# Patient Record
Sex: MALE | Race: WHITE | NOT HISPANIC OR LATINO | Employment: FULL TIME | URBAN - METROPOLITAN AREA
[De-identification: names, ages, dates, MRNs, and addresses within clinical notes are randomized per-mention and may not be internally consistent; named-entity substitution may affect disease eponyms.]

---

## 2022-03-07 ENCOUNTER — HISTORICAL (OUTPATIENT)
Dept: RADIOLOGY | Facility: HOSPITAL | Age: 57
End: 2022-03-07

## 2022-03-07 ENCOUNTER — HISTORICAL (OUTPATIENT)
Dept: ADMINISTRATIVE | Facility: HOSPITAL | Age: 57
End: 2022-03-07

## 2024-04-01 ENCOUNTER — HOSPITAL ENCOUNTER (OUTPATIENT)
Dept: CARDIOLOGY | Facility: HOSPITAL | Age: 59
Discharge: HOME | End: 2024-04-01
Attending: FAMILY MEDICINE
Payer: COMMERCIAL

## 2024-04-01 DIAGNOSIS — R00.2 PALPITATIONS: Primary | ICD-10-CM

## 2024-04-01 DIAGNOSIS — R00.2 PALPITATIONS: ICD-10-CM

## 2024-04-01 PROCEDURE — 93227 XTRNL ECG REC<48 HR R&I: CPT | Mod: ,,, | Performed by: INTERNAL MEDICINE

## 2024-04-01 PROCEDURE — 93226 XTRNL ECG REC<48 HR SCAN A/R: CPT

## 2024-04-03 ENCOUNTER — HOSPITAL ENCOUNTER (EMERGENCY)
Facility: HOSPITAL | Age: 59
Discharge: HOME | End: 2024-04-03
Attending: STUDENT IN AN ORGANIZED HEALTH CARE EDUCATION/TRAINING PROGRAM
Payer: COMMERCIAL

## 2024-04-03 VITALS
OXYGEN SATURATION: 95 % | BODY MASS INDEX: 19.76 KG/M2 | SYSTOLIC BLOOD PRESSURE: 125 MMHG | HEART RATE: 57 BPM | RESPIRATION RATE: 12 BRPM | TEMPERATURE: 98 F | WEIGHT: 138 LBS | DIASTOLIC BLOOD PRESSURE: 76 MMHG | HEIGHT: 70 IN

## 2024-04-03 DIAGNOSIS — R07.9 CHEST PAIN, UNSPECIFIED TYPE: Primary | ICD-10-CM

## 2024-04-03 DIAGNOSIS — R07.9 CHEST PAIN: ICD-10-CM

## 2024-04-03 DIAGNOSIS — R00.2 PALPITATIONS: ICD-10-CM

## 2024-04-03 LAB
ALBUMIN SERPL-MCNC: 4 G/DL (ref 3.5–5)
ALBUMIN/GLOB SERPL: 1.2 RATIO (ref 1.1–2)
ALP SERPL-CCNC: 90 UNIT/L (ref 40–150)
ALT SERPL-CCNC: 13 UNIT/L (ref 0–55)
AST SERPL-CCNC: 19 UNIT/L (ref 5–34)
BASOPHILS # BLD AUTO: 0.04 X10(3)/MCL
BASOPHILS NFR BLD AUTO: 0.5 %
BILIRUB SERPL-MCNC: 0.3 MG/DL
BNP BLD-MCNC: 11 PG/ML
BUN SERPL-MCNC: 12.9 MG/DL (ref 8.4–25.7)
CALCIUM SERPL-MCNC: 9.2 MG/DL (ref 8.4–10.2)
CHLORIDE SERPL-SCNC: 104 MMOL/L (ref 98–107)
CO2 SERPL-SCNC: 27 MMOL/L (ref 22–29)
CREAT SERPL-MCNC: 0.89 MG/DL (ref 0.73–1.18)
EOSINOPHIL # BLD AUTO: 0.05 X10(3)/MCL (ref 0–0.9)
EOSINOPHIL NFR BLD AUTO: 0.7 %
ERYTHROCYTE [DISTWIDTH] IN BLOOD BY AUTOMATED COUNT: 12.5 % (ref 11.5–17)
GFR SERPLBLD CREATININE-BSD FMLA CKD-EPI: >60 MLS/MIN/1.73/M2
GLOBULIN SER-MCNC: 3.3 GM/DL (ref 2.4–3.5)
GLUCOSE SERPL-MCNC: 122 MG/DL (ref 74–100)
HCT VFR BLD AUTO: 43 % (ref 42–52)
HGB BLD-MCNC: 14.2 G/DL (ref 14–18)
IMM GRANULOCYTES # BLD AUTO: 0.02 X10(3)/MCL (ref 0–0.04)
IMM GRANULOCYTES NFR BLD AUTO: 0.3 %
LYMPHOCYTES # BLD AUTO: 2.08 X10(3)/MCL (ref 0.6–4.6)
LYMPHOCYTES NFR BLD AUTO: 28.5 %
MCH RBC QN AUTO: 29.8 PG (ref 27–31)
MCHC RBC AUTO-ENTMCNC: 33 G/DL (ref 33–36)
MCV RBC AUTO: 90.1 FL (ref 80–94)
MONOCYTES # BLD AUTO: 0.45 X10(3)/MCL (ref 0.1–1.3)
MONOCYTES NFR BLD AUTO: 6.2 %
NEUTROPHILS # BLD AUTO: 4.67 X10(3)/MCL (ref 2.1–9.2)
NEUTROPHILS NFR BLD AUTO: 63.8 %
NRBC BLD AUTO-RTO: 0 %
PLATELET # BLD AUTO: 220 X10(3)/MCL (ref 130–400)
PMV BLD AUTO: 10 FL (ref 7.4–10.4)
POTASSIUM SERPL-SCNC: 4 MMOL/L (ref 3.5–5.1)
PROT SERPL-MCNC: 7.3 GM/DL (ref 6.4–8.3)
RBC # BLD AUTO: 4.77 X10(6)/MCL (ref 4.7–6.1)
SODIUM SERPL-SCNC: 137 MMOL/L (ref 136–145)
TROPONIN I SERPL-MCNC: <0.01 NG/ML (ref 0–0.04)
TROPONIN I SERPL-MCNC: <0.01 NG/ML (ref 0–0.04)
WBC # SPEC AUTO: 7.31 X10(3)/MCL (ref 4.5–11.5)

## 2024-04-03 PROCEDURE — 84484 ASSAY OF TROPONIN QUANT: CPT | Performed by: PHYSICIAN ASSISTANT

## 2024-04-03 PROCEDURE — 80053 COMPREHEN METABOLIC PANEL: CPT | Performed by: PHYSICIAN ASSISTANT

## 2024-04-03 PROCEDURE — 93005 ELECTROCARDIOGRAM TRACING: CPT

## 2024-04-03 PROCEDURE — 93010 ELECTROCARDIOGRAM REPORT: CPT | Mod: ,,, | Performed by: INTERNAL MEDICINE

## 2024-04-03 PROCEDURE — 99285 EMERGENCY DEPT VISIT HI MDM: CPT | Mod: 25

## 2024-04-03 PROCEDURE — 84484 ASSAY OF TROPONIN QUANT: CPT | Performed by: STUDENT IN AN ORGANIZED HEALTH CARE EDUCATION/TRAINING PROGRAM

## 2024-04-03 PROCEDURE — 85025 COMPLETE CBC W/AUTO DIFF WBC: CPT | Performed by: PHYSICIAN ASSISTANT

## 2024-04-03 PROCEDURE — 83880 ASSAY OF NATRIURETIC PEPTIDE: CPT | Performed by: PHYSICIAN ASSISTANT

## 2024-04-03 NOTE — FIRST PROVIDER EVALUATION
"Medical screening examination initiated.  I have conducted a focused provider triage encounter, findings are as follows:    Chief Complaint   Patient presents with    Chest Pain     L. Sided cp that pt describes as "tight" and palpitations x4 wks. Has seen his PCP multiple times for it and states had labs/EKG/CT scan done and was referred to Cardiology. Pt wore a 24 hr holter monitor and turned it in on 4/02, has not heard anything regarding it yet. Pt takes suboxone daily, has been on it x5 yrs. Denies further PMH.      Brief history of present illness:  58 y.o. male presents to the ED with left sided chest pain onset 4 weeks ago. Has had work-up in the past with PCP, had holter monitor on 4/2, not given any results yet.     Vitals:    04/03/24 1611   BP: (!) 146/80   Pulse: 82   Resp: 19   Temp: 98.1 °F (36.7 °C)   TempSrc: Oral   SpO2: 97%   Weight: 62.6 kg (138 lb)   Height: 5' 10" (1.778 m)       Pertinent physical exam:  Awake, alert, ambulatory, non-labored respirations    Brief workup plan:  labs, EKG, CXR    Preliminary workup initiated; this workup will be continued and followed by the physician or advanced practice provider that is assigned to the patient when roomed.  "

## 2024-04-04 LAB
OHS QRS DURATION: 96 MS
OHS QTC CALCULATION: 427 MS

## 2024-04-04 NOTE — DISCHARGE INSTRUCTIONS
Thanks for letting us take care of you today!  It is our goal to give you courteous care and to keep you comfortable and informed, if you have any questions before you leave I will be happy to try and answer them.    Here is some advice after your visit:    Your visit in the emergency department is NOT definitive care - please follow-up with your primary care doctor and/or specialist within 1-2 days. Please return to the emergency department if you develop worsening symptoms including: fever, chills, chest pain, shortness of breath, weakness, numbness, tingling, nausea, vomiting, inability to eat, drink, or take your medication. Please return if you have any worsening in your condition or if you have any other concerns.    If you had radiology exams like an XRAY or CT in the emergency Department the interpreation on them may be preliminary - there may be less time sensitive findings on the reports please obtain these reports within 24 hours from the hospital or by using your out on your mobile phone to access records.  Bring these to your primary care doctor and/or specialist for further review of incidental findings.    Please review any LAB WORK from your visit today with your primary care physician.    Please be sure to follow up with the cardiologist and/or your primary care physician for further evaluation management.  You are welcome to return emergency department any time for any worsening symptoms.

## 2024-04-04 NOTE — ED PROVIDER NOTES
"Encounter Date: 4/3/2024    SCRIBE #1 NOTE: I, Kanu Lemus, am scribing for, and in the presence of,  Axel Blanton MD. I have scribed the following portions of the note - Other sections scribed: HPI, ROS, PE.       History     Chief Complaint   Patient presents with    Chest Pain     L. Sided cp that pt describes as "tight" and palpitations x4 wks. Has seen his PCP multiple times for it and states had labs/EKG/CT scan done and was referred to Cardiology. Pt wore a 24 hr holter monitor and turned it in on 4/02, has not heard anything regarding it yet. Pt takes suboxone daily, has been on it x5 yrs. Denies further PMH.      58 year old male presents to ED for evaluation of chest pain and palpitations onset a few weeks ago. Pt reports that his PCP is aware of his symptoms and gave a 24 hour holter monitor, taken off yesterday, has not received results yet, not followed by cardiology. Pt states that his palpations are intermittent and usually are present when he wakes up. Pt describes chest pain as intermittent tightness in his left chest, unchanged since onset. Pt denies cardiac history, family cardiac history, and pmhx of HTN.  Pt reports that he is prescribed suboxone, and some 1-1.5 packs of cigarettes per day, denies ETOh and illicit drugs.     The history is provided by the patient. No  was used.     Review of patient's allergies indicates:  No Known Allergies  No past medical history on file.  No past surgical history on file.  No family history on file.     Review of Systems   Constitutional:  Negative for fever.   Respiratory:  Negative for shortness of breath.    Cardiovascular:  Positive for chest pain.   Gastrointestinal:  Negative for abdominal pain.       Physical Exam     Initial Vitals [04/03/24 1611]   BP Pulse Resp Temp SpO2   (!) 146/80 82 19 98.1 °F (36.7 °C) 97 %      MAP       --         Physical Exam    Constitutional: He appears well-developed and well-nourished. " He is not diaphoretic. No distress.   thin   HENT:   Head: Normocephalic and atraumatic.   Right Ear: External ear normal.   Left Ear: External ear normal.   Nose: Nose normal.   Eyes: EOM are normal. Pupils are equal, round, and reactive to light. Right eye exhibits no discharge. Left eye exhibits no discharge.   Cardiovascular:  Normal rate, regular rhythm and normal heart sounds.     Exam reveals no gallop and no friction rub.       No murmur heard.  Pulmonary/Chest: Effort normal and breath sounds normal. No respiratory distress. He has no wheezes. He has no rhonchi. He has no rales. He exhibits no tenderness.   Abdominal: Abdomen is soft. Bowel sounds are normal. He exhibits no distension and no mass. There is no abdominal tenderness. There is no rebound and no guarding.   Musculoskeletal:         General: No edema. Normal range of motion.     Neurological: He is alert and oriented to person, place, and time. No cranial nerve deficit or sensory deficit.   Skin: Skin is warm and dry. Capillary refill takes less than 2 seconds.         ED Course   Procedures  Labs Reviewed   COMPREHENSIVE METABOLIC PANEL - Abnormal; Notable for the following components:       Result Value    Glucose Level 122 (*)     All other components within normal limits   B-TYPE NATRIURETIC PEPTIDE - Normal   TROPONIN I - Normal   TROPONIN I - Normal   CBC W/ AUTO DIFFERENTIAL    Narrative:     The following orders were created for panel order CBC auto differential.  Procedure                               Abnormality         Status                     ---------                               -----------         ------                     CBC with Differential[[id number]]                           Final result                 Please view results for these tests on the individual orders.   CBC WITH DIFFERENTIAL     EKG Readings: (Independently Interpreted)   Initial Reading: No STEMI. Rhythm: Normal Sinus Rhythm. Heart Rate: 81. ST Segments:  Normal ST Segments. T Waves: Normal. Axis: Right Axis Deviation.   Time: 1609  QTC: 427  High voltage QRS complexes     ECG Results              EKG 12-lead (Final result)        Collection Time Result Time QRS Duration OHS QTC Calculation    04/03/24 16:09:56 04/04/24 02:22:10 96 427                     Final result by Interface, Lab In [facility] (04/04/24 02:22:14)                   Narrative:    Test Reason : R07.9,    Vent. Rate : 081 BPM     Atrial Rate : 081 BPM     P-R Int : 144 ms          QRS Dur : 096 ms      QT Int : 368 ms       P-R-T Axes : 089 100 071 degrees     QTc Int : 427 ms    Normal sinus rhythm  Rightward axis  Pulmonary disease pattern  Abnormal ECG  No previous ECGs available  Confirmed by Eliceo Edmonds MD ([id number]) on 4/4/2024 2:22:08 AM    Referred By:             Confirmed By:Eliceo Edmonds MD                                  Imaging Results              X-Ray Chest PA And Lateral (Final result)  Result time 04/03/24 17:14:07      Final result by Melody Carmichael MD (04/03/24 17:14:07)                   Impression:      No acute cardiopulmonary abnormality.      Electronically signed by: Melody Carmichael  Date:    04/03/2024  Time:    17:14               Narrative:    EXAMINATION:  XR CHEST PA AND LATERAL    CLINICAL HISTORY:  Chest pain, unspecified    TECHNIQUE:  Two views of the chest    COMPARISON:  No relevant prior available at the time of dictation.    FINDINGS:  LINES AND TUBES: None    MEDIASTINUM AND JAKE: The cardiac silhouette is normal.    LUNGS: No lobar consolidation. No edema.    PLEURA:No pleural effusion. No pneumothorax.    BONES: No acute osseous abnormality.                                       Medications - No data to display  Medical Decision Making  Differential diagnosis to include but not limited to Chest pain emergent diagnoses: ACS, PE, dissection, cardiac tamponade, tension pneumothorax.  Chest pain non-emergent diagnoses: Musculoskeletal, trauma, pleurisy,  pneumonia, pleural effusion, GERD, other GI, neurologic, psychiatric and other non emergent diagnoses considered.        Patient is awake alert well-appearing.  Resting comfortably.  Not tachycardic here in the emergency department.  His troponin negative x2.  No significant electrolyte abnormalities or renal dysfunction.  Chest x-ray clear of any infiltrates.  Seems he has been having these symptoms off and on for some time.  Currently being evaluated.  Recently had a Holter monitor but it was not resulted yet.  Offered to get him close follow up with Cardiology however he declined reports he will go through his primary care physician.  Believe this to be reasonable.  Believe he was otherwise suitable for discharge at this time.  Patient comfortable with plan. Return precautions given.  Questions invited, questions answered to the best my ability.  Patient discharged home condition stable.      Amount and/or Complexity of Data Reviewed  External Data Reviewed: notes.     Details: See ED course  Labs: ordered. Decision-making details documented in ED Course.  Radiology: ordered and independent interpretation performed. Decision-making details documented in ED Course.  ECG/medicine tests: ordered and independent interpretation performed. Decision-making details documented in ED Course.            Scribe Attestation:   Scribe #1: I performed the above scribed service and the documentation accurately describes the services I performed. I attest to the accuracy of the note.    Attending Attestation:           Physician Attestation for Scribe:  Physician Attestation Statement for Scribe #1: I, Axel Blanotn MD, reviewed documentation, as scribed by Kanu Lemus in my presence, and it is both accurate and complete.             ED Course as of 04/04/24 0238   Wed Apr 03, 2024 2010 Chart review reveals patient has been seen for chest pain emergency department 03/20/2024, at that time complaining of chest pain  and palpitations.  General anxiety disorder 04/01/2024 PCP.  Was set up and had a Holter monitor placed for/1/24. [MM]   2011 EKG done at 4:09 p.m. shows sinus rhythm rate 81 .  Right axis deviation.  High voltage QRS complexes.  No ST elevation or depression. [MM]   2306 Troponin I: <0.010  Troponin negative x2. [MM]   Thu Apr 04, 2024   0237 Comprehensive metabolic panel(!)  No significant electrolyte abnormalities or renal dysfunction. [MM]   0237 BNP: 11.0 [MM]   0237 CBC auto differential  No leukocytosis no anemia [MM]   0237 X-Ray Chest PA And Lateral  Negative for acute [MM]      ED Course User Index  [MM] Axel Blanton MD                           Clinical Impression:  Final diagnoses:  [R07.9] Chest pain  [R07.9] Chest pain, unspecified type (Primary)  [R00.2] Palpitations          ED Disposition Condition    Discharge Stable          ED Prescriptions    None       Follow-up Information       Follow up With Specialties Details Why Contact Info    Flora Wei MD Family Medicine Call   [address] [place] [address] [phone]               Axel Blanton MD  04/04/24 [id number]

## 2024-04-08 LAB
OHS CV EVENT MONITOR DAY: 0
OHS CV HOLTER LENGTH DECIMAL HOURS: 24
OHS CV HOLTER LENGTH HOURS: 24
OHS CV HOLTER LENGTH MINUTES: 0
OHS CV HOLTER SINUS AVERAGE HR: 69
OHS CV HOLTER SINUS MAX HR: 104
OHS CV HOLTER SINUS MIN HR: 55

## 2025-02-21 ENCOUNTER — HOSPITAL ENCOUNTER (OUTPATIENT)
Dept: RADIOLOGY | Facility: HOSPITAL | Age: 60
Discharge: HOME | End: 2025-02-21
Attending: FAMILY MEDICINE
Payer: COMMERCIAL

## 2025-02-21 DIAGNOSIS — R10.31 ABDOMINAL PAIN, RIGHT LOWER QUADRANT: ICD-10-CM

## 2025-02-21 PROCEDURE — 25500020 PHARM REV CODE 255: Performed by: FAMILY MEDICINE

## 2025-02-21 PROCEDURE — 74178 CT ABD&PLV WO CNTR FLWD CNTR: CPT | Mod: TC

## 2025-02-21 RX ORDER — IOPAMIDOL 755 MG/ML
100 INJECTION, SOLUTION INTRAVASCULAR
Status: COMPLETED | OUTPATIENT
Start: 2025-02-21 | End: 2025-02-21

## 2025-02-21 RX ADMIN — IOPAMIDOL 100 ML: 755 INJECTION, SOLUTION INTRAVENOUS at 05:02
